# Patient Record
(demographics unavailable — no encounter records)

---

## 2025-07-16 NOTE — ASSESSMENT
[FreeTextEntry1] :  The patient is being seen today for her right wrist.  She had a fall onto her right upper extremity a little over a week ago.  She went to [place] where they placed her into a sugar-tong splint.  She states that it is quite heavy.  She is accompanied by her daughter.  Her splint was removed.  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 We discussed she may proceed with nonoperative treatment in a cast. A fiberglass short arm fiberglass cast was applied.  The importance of ice and elevation were discussed with the patient.  The risks were also discussed such as compartment syndrome and skin breakdown.  They were instructed to never put foreign objects down the cast.  Patients should call for increasing pain, worsening swelling, numbness, extremity discoloration, or any other concerns.  She will follow up in 3 weeks for cast off, new images and reevaluation.